# Patient Record
Sex: FEMALE | Race: OTHER | Employment: FULL TIME | URBAN - METROPOLITAN AREA
[De-identification: names, ages, dates, MRNs, and addresses within clinical notes are randomized per-mention and may not be internally consistent; named-entity substitution may affect disease eponyms.]

---

## 2023-02-09 ENCOUNTER — HOSPITAL ENCOUNTER (EMERGENCY)
Age: 23
Discharge: HOME | End: 2023-02-09
Attending: EMERGENCY MEDICINE
Payer: COMMERCIAL

## 2023-02-09 ENCOUNTER — APPOINTMENT (OUTPATIENT)
Dept: CT IMAGING | Age: 23
End: 2023-02-09
Attending: EMERGENCY MEDICINE
Payer: COMMERCIAL

## 2023-02-09 ENCOUNTER — APPOINTMENT (OUTPATIENT)
Dept: GENERAL RADIOLOGY | Age: 23
End: 2023-02-09
Attending: EMERGENCY MEDICINE
Payer: COMMERCIAL

## 2023-02-09 VITALS
WEIGHT: 260 LBS | RESPIRATION RATE: 18 BRPM | DIASTOLIC BLOOD PRESSURE: 82 MMHG | BODY MASS INDEX: 44.39 KG/M2 | SYSTOLIC BLOOD PRESSURE: 130 MMHG | OXYGEN SATURATION: 98 % | HEIGHT: 64 IN | HEART RATE: 88 BPM | TEMPERATURE: 98.6 F

## 2023-02-09 DIAGNOSIS — R10.84 ABDOMINAL PAIN, GENERALIZED: Primary | ICD-10-CM

## 2023-02-09 DIAGNOSIS — N83.202 CYST OF LEFT OVARY: ICD-10-CM

## 2023-02-09 DIAGNOSIS — D72.829 LEUKOCYTOSIS, UNSPECIFIED TYPE: ICD-10-CM

## 2023-02-09 DIAGNOSIS — R93.5 ABNORMAL CT OF THE ABDOMEN: ICD-10-CM

## 2023-02-09 LAB
ALBUMIN SERPL-MCNC: 3.3 G/DL (ref 3.5–5)
ALBUMIN/GLOB SERPL: 0.9 (ref 1.1–2.2)
ALP SERPL-CCNC: 83 U/L (ref 45–117)
ALT SERPL-CCNC: 27 U/L (ref 12–78)
ANION GAP SERPL CALC-SCNC: 6 MMOL/L (ref 5–15)
APPEARANCE UR: CLEAR
AST SERPL-CCNC: 16 U/L (ref 15–37)
BACTERIA URNS QL MICRO: NEGATIVE /HPF
BASOPHILS # BLD: 0.1 K/UL (ref 0–0.1)
BASOPHILS NFR BLD: 0 % (ref 0–1)
BILIRUB SERPL-MCNC: 0.6 MG/DL (ref 0.2–1)
BILIRUB UR QL: NEGATIVE
BUN SERPL-MCNC: 10 MG/DL (ref 6–20)
BUN/CREAT SERPL: 14 (ref 12–20)
CALCIUM SERPL-MCNC: 9.1 MG/DL (ref 8.5–10.1)
CHLORIDE SERPL-SCNC: 107 MMOL/L (ref 97–108)
CO2 SERPL-SCNC: 29 MMOL/L (ref 21–32)
COLOR UR: ABNORMAL
COMMENT, HOLDF: NORMAL
CREAT SERPL-MCNC: 0.73 MG/DL (ref 0.55–1.02)
DIFFERENTIAL METHOD BLD: ABNORMAL
EOSINOPHIL # BLD: 0.1 K/UL (ref 0–0.4)
EOSINOPHIL NFR BLD: 1 % (ref 0–7)
EPITH CASTS URNS QL MICRO: ABNORMAL /LPF
ERYTHROCYTE [DISTWIDTH] IN BLOOD BY AUTOMATED COUNT: 13.3 % (ref 11.5–14.5)
GLOBULIN SER CALC-MCNC: 3.6 G/DL (ref 2–4)
GLUCOSE SERPL-MCNC: 117 MG/DL (ref 65–100)
GLUCOSE UR STRIP.AUTO-MCNC: NEGATIVE MG/DL
HCG UR QL: NEGATIVE
HCT VFR BLD AUTO: 39 % (ref 35–47)
HGB BLD-MCNC: 12.8 G/DL (ref 11.5–16)
HGB UR QL STRIP: NEGATIVE
HYALINE CASTS URNS QL MICRO: ABNORMAL /LPF (ref 0–2)
IMM GRANULOCYTES # BLD AUTO: 0.1 K/UL (ref 0–0.04)
IMM GRANULOCYTES NFR BLD AUTO: 0 % (ref 0–0.5)
KETONES UR QL STRIP.AUTO: NEGATIVE MG/DL
LEUKOCYTE ESTERASE UR QL STRIP.AUTO: NEGATIVE
LIPASE SERPL-CCNC: 86 U/L (ref 73–393)
LYMPHOCYTES # BLD: 3.1 K/UL (ref 0.8–3.5)
LYMPHOCYTES NFR BLD: 20 % (ref 12–49)
MCH RBC QN AUTO: 27.4 PG (ref 26–34)
MCHC RBC AUTO-ENTMCNC: 32.8 G/DL (ref 30–36.5)
MCV RBC AUTO: 83.5 FL (ref 80–99)
MONOCYTES # BLD: 0.9 K/UL (ref 0–1)
MONOCYTES NFR BLD: 6 % (ref 5–13)
NEUTS SEG # BLD: 11 K/UL (ref 1.8–8)
NEUTS SEG NFR BLD: 73 % (ref 32–75)
NITRITE UR QL STRIP.AUTO: NEGATIVE
NRBC # BLD: 0 K/UL (ref 0–0.01)
NRBC BLD-RTO: 0 PER 100 WBC
PH UR STRIP: 5.5 (ref 5–8)
PLATELET # BLD AUTO: 333 K/UL (ref 150–400)
PMV BLD AUTO: 9.5 FL (ref 8.9–12.9)
POTASSIUM SERPL-SCNC: 3.4 MMOL/L (ref 3.5–5.1)
PROT SERPL-MCNC: 6.9 G/DL (ref 6.4–8.2)
PROT UR STRIP-MCNC: NEGATIVE MG/DL
RBC # BLD AUTO: 4.67 M/UL (ref 3.8–5.2)
RBC #/AREA URNS HPF: ABNORMAL /HPF (ref 0–5)
SAMPLES BEING HELD,HOLD: NORMAL
SODIUM SERPL-SCNC: 142 MMOL/L (ref 136–145)
SP GR UR REFRACTOMETRY: 1.02 (ref 1–1.03)
UR CULT HOLD, URHOLD: NORMAL
UROBILINOGEN UR QL STRIP.AUTO: 1 EU/DL (ref 0.2–1)
WBC # BLD AUTO: 15.1 K/UL (ref 3.6–11)
WBC URNS QL MICRO: ABNORMAL /HPF (ref 0–4)

## 2023-02-09 PROCEDURE — 81025 URINE PREGNANCY TEST: CPT

## 2023-02-09 PROCEDURE — 81001 URINALYSIS AUTO W/SCOPE: CPT

## 2023-02-09 PROCEDURE — 87086 URINE CULTURE/COLONY COUNT: CPT

## 2023-02-09 PROCEDURE — 99284 EMERGENCY DEPT VISIT MOD MDM: CPT

## 2023-02-09 PROCEDURE — 85025 COMPLETE CBC W/AUTO DIFF WBC: CPT

## 2023-02-09 PROCEDURE — 80053 COMPREHEN METABOLIC PANEL: CPT

## 2023-02-09 PROCEDURE — 83690 ASSAY OF LIPASE: CPT

## 2023-02-09 PROCEDURE — 74176 CT ABD & PELVIS W/O CONTRAST: CPT

## 2023-02-09 PROCEDURE — 36415 COLL VENOUS BLD VENIPUNCTURE: CPT

## 2023-02-09 PROCEDURE — 71046 X-RAY EXAM CHEST 2 VIEWS: CPT

## 2023-02-09 RX ORDER — NAPROXEN 500 MG/1
500 TABLET ORAL 2 TIMES DAILY WITH MEALS
Qty: 20 TABLET | Refills: 0 | Status: SHIPPED | OUTPATIENT
Start: 2023-02-09 | End: 2023-02-19

## 2023-02-09 RX ORDER — NAPROXEN 500 MG/1
500 TABLET ORAL 2 TIMES DAILY WITH MEALS
Qty: 20 TABLET | Refills: 0 | OUTPATIENT
Start: 2023-02-09 | End: 2023-02-09 | Stop reason: SDUPTHER

## 2023-02-09 NOTE — ED PROVIDER NOTES
Chest Pain (Angina)   Pertinent negatives include no abdominal pain, no cough, no dizziness, no fever, no headaches, no nausea, no palpitations, no shortness of breath and no vomiting. Patient is a 79-year-old female with no significant past medical history who presents to the ED with left-sided chest wall pain and abdominal pain for 2 days. [facility] and referred to the emergency room for further evaluation. LMP 1/16/2023. Denies fever, cold symptoms, headache, neck pain, visual changes, focal weakness or rash. Denies any difficulty breathing, difficulty swallowing or  SOB. Denies any nausea, vomiting or diarrhea. Pt. Reports that she has not had any medications today prior to arrival.   Patient tested negative for flu and [facility] at [facility] today. No past medical history on file. No past surgical history on file. No family history on file. Social History     Socioeconomic History    Marital status: Not on file     Spouse name: Not on file    Number of children: Not on file    Years of education: Not on file    Highest education level: Not on file   Occupational History    Not on file   Tobacco Use    Smoking status: Not on file    Smokeless tobacco: Not on file   Substance and Sexual Activity    Alcohol use: Not on file    Drug use: Not on file    Sexual activity: Not on file   Other Topics Concern    Not on file   Social History Narrative    Not on file     Social Determinants of Health     Financial Resource Strain: Not on file   Food Insecurity: Not on file   Transportation Needs: Not on file   Physical Activity: Not on file   Stress: Not on file   Social Connections: Not on file   Intimate Partner Violence: Not on file   Housing Stability: Not on file         ALLERGIES: Patient has no known allergies. Review of Systems   Constitutional:  Positive for appetite change. Negative for activity change, fever and unexpected weight change.    HENT: Negative for congestion and sore throat. Eyes:  Negative for visual disturbance. Respiratory:  Negative for cough and shortness of breath. Cardiovascular:  Positive for chest pain. Negative for palpitations and leg swelling. Gastrointestinal:  Negative for abdominal pain, constipation, diarrhea, nausea and vomiting. Genitourinary:  Positive for flank pain. Negative for dysuria, menstrual problem, vaginal bleeding and vaginal discharge. Musculoskeletal:  Negative for arthralgias, gait problem and neck pain. Skin:  Negative for rash. Neurological:  Negative for dizziness, light-headedness and headaches. All other systems reviewed and are negative. Vitals:    02/09/23 1424   BP: 134/81   Pulse: 95   Resp: 16   Temp: 98.4 °F (36.9 °C)   SpO2: 98%   Weight: 117.9 kg (260 lb)   Height: 5' 4\" (1.626 m)            Physical Exam  Vitals and nursing note reviewed. Constitutional:       General: She is not in acute distress. Appearance: She is well-developed. She is not ill-appearing, toxic-appearing or diaphoretic. Comments: White female; smokes marijuana; works at [employer]   HENT:      Head: Normocephalic. Cardiovascular:      Rate and Rhythm: Normal rate and regular rhythm. Heart sounds: Normal heart sounds. Pulmonary:      Effort: Pulmonary effort is normal.      Breath sounds: Normal breath sounds. Chest:      Chest wall: Tenderness present. Abdominal:      Palpations: Abdomen is soft. There is no mass. Tenderness: There is no abdominal tenderness. There is no guarding or rebound. Musculoskeletal:         General: Normal range of motion. Cervical back: Normal range of motion and neck supple. Right lower leg: No tenderness. No edema. Left lower leg: No tenderness. No edema. Lymphadenopathy:      Cervical: No cervical adenopathy. Skin:     General: Skin is warm and dry. Findings: No erythema.    Neurological:      Mental Status: She is alert. Medical Decision Making  Amount and/or Complexity of Data Reviewed  Labs: ordered. Radiology: ordered. Risk  Prescription drug management. Procedures        Labs Reviewed   CBC WITH AUTOMATED DIFF - Abnormal; Notable for the following components:       Result Value    WBC 15.1 (*)     ABS. NEUTROPHILS 11.0 (*)     ABS. IMM. GRANS. 0.1 (*)     All other components within normal limits   METABOLIC PANEL, COMPREHENSIVE - Abnormal; Notable for the following components:    Potassium 3.4 (*)     Glucose 117 (*)     Albumin 3.3 (*)     A-G Ratio 0.9 (*)     All other components within normal limits   URINALYSIS W/MICROSCOPIC - Abnormal; Notable for the following components:    Epithelial cells MANY (*)     All other components within normal limits   URINE CULTURE HOLD SAMPLE   CULTURE, URINE   LIPASE   SAMPLES BEING HELD   HCG URINE, QL. - POC     .5:07 PM  Change of shift. Care of patient signed over to [facility] NP. Bedside handoff complete. Awaiting CT abdomen and pelvis results Dakota Lopez NP    Refer to previous progress note for full details of discussion at discharge with patient. CT abd showing Left Ovarian cyst only. Discharged home with follow up and work note.    Florecita Pisano NP  11:47 PM

## 2023-02-09 NOTE — Clinical Note
[address]  [facility] EMERGENCY DEPT  [address] [id number]  [phone]    Work/School Note    Date: 2/9/2023    To Whom It May concern:    Kevin Hernandez was seen and treated today in the emergency room by the following provider(s):  Attending Provider: Fred Mckay MD  Nurse Practitioner: Earlene Hairston NP. Kevin Hernandez is excused from work/school on 2/9/2023 through 2/11/2023. She is medically clear to return to work/school on 2/12/2023.          Sincerely,          Taran Fitzgerald NP

## 2023-02-09 NOTE — ED TRIAGE NOTES
Patient reports she was sent here by [facility] for further evaluation of a an elevated WBC-    Patient reports generalized fatigue and chest pain for the last few days.

## 2023-02-09 NOTE — DISCHARGE INSTRUCTIONS
Your lab work today was reassuring, the CAT scan of your abdomen showed that you have a small cyst on your left ovary. This may cause discomfort in your abdomen. I have sent a prescription over to the pharmacy for you for extra strength naproxen which is the same as Aleve, take this with food on your stomach, once in the morning and once at night. Consider following up with primary care provider and GI specialist as needed. Refer to the free/low-cost clinic sheet. [facility] Departments     For adult and child immunizations, family planning, TB screening, STD testing and women's health services. [facility]: [place] [phone]      Psychiatric 25   657 [facility]   [address]   170 [facility] 200 [facility] [phone] 24[facility]          [address] [address]     For primary care services, woman and child wellness, and some clinics providing specialty care. [facility] -- [facility]. [address] [place] [address] [phone]   [address] [place] [address] 200 [facility] [address] [place] [address] [phone]   [address] [facility] [address] [phone]   [address] [facility] [address] [place] [address] 5850 [facility]  [phone]   [facility] [address] [address] [phone]   [facility] 81 [facility] [phone]   [address] [phone]   [facility]: [facility] [address] [facility] [address] [phone]     [address] [phone]   [facility] Out[facility] 5850 [facility]  [phone]   [facility]  [address] ([email or web address]) [phone]         Sexual Health/Woman Wellness Clinics    For STD/HIV testing and treatment, pregnancy testing and services, men's health, birth control services, LGBT services, and hepatitis/HPV vaccine services. [facility] for [place] [employer] [address]. [facility] [address] [facility] 1579 [address] [facility] [phone]   [facility] [address] , 5th floor [phone]   Pregnancy [address] [facility] Women [address] [phone]         [place] [address] [facility] [address] [place] [address]   [phone]   [place]   [phone]   Women, Infant and Children's Services: [address] [phone] 600 [facility]   [phone]   [address] [facility] Psychiatry     [phone]   [address] Crisis   1212 [facility] [phone]       Local Primary Care Physicians  [facility] [phone]  MD Lizet Leos MD Berkelsaige Guaman MD [facility] [phone]  Rupesh Brown, [facility]  MD Missy Bourgeois MD Branson Borne, MD [address] [address] [phone]  MD Angle [place] [address] [facility] [phone]  MD Arik Stewart MD Forestine Solomon, MD Warnell Nares, MD   [facility] [phone]  MD Loida PRETTY MD [facility], NP [address] [employer] [phone]  MD Brynn Hewitt MD Lennard Mode, MD Layne Enter, MD Ozie Landry, MD Reed Bullion, MD Bailey Rima, MD   [facility] [phone]  Dawn Jeans, MD [facility] [phone]  MD Scott Ocasio, NP  MD Loreto Ruano MD Myrle Musty, MD Corina Ruffing, MD Dona Antonio, MD   [address] e [phone]  Gaviota Cope, MD Jj Martinez, FNP  Helen Patino, ELICIA Bright, MD Marvin Diaz, MD Bro Khan, MD Matt Villeda, MD BARTH [facility] [phone]  Hector Carr, MD Ghazala Su, MD Criselda Howard, MD Norberto Santizo, MD Shawna Bloch, MD   [facility] [phone]  MD Kraig Medeiros, MD Maya [phone]  Corine Tam, MD Yinka Swanson, MD Claudia Schroeder, MD   [facility] [phone]  Melina Self, MD Zoë Yeung, MD Luanne Carpenter, MD Horacio Pillai, MD Rubia Bliss, MD Reza Latham, NP  Alondra Jones MD 1619 [facility]   [phone]  MD Enedina Díaz MD Tarri Knows, MD   1553 [facility] [phone]  Gray Morel, MD Deven Newman, FNP  Geovanny Armenta, SREE Armenta, FNSREE Bear MD Robb Czar, ELICIA Thomas DO Miscellaneous:  Jb [place]oa [phone]

## 2023-02-09 NOTE — PROGRESS NOTES
5:07 PM  Change of shift. Care of patient signed over to Assumed care of patient from [address] Bedside handoff complete. Awaiting CT ABD pelv results. Patient reevaluated CAT scan results discussed with patient showing left ovarian cyst.  Discussed final lab results with the patient, mild leukocytosis 15.1, discussed with patient to follow-up with GI specialist for continued discomfort, establish care with primary care provider for further evaluation, work note provided and prescription for naproxen. Patient given return precautions for the ER, otherwise follow-up with primary care. Patient is alert, fully cognizant, verbalized understanding agrees with plan.   Amy Verner Scheuermann, NP  6:47 PM

## 2023-02-10 LAB
BACTERIA SPEC CULT: NORMAL
SERVICE CMNT-IMP: NORMAL